# Patient Record
Sex: FEMALE
[De-identification: names, ages, dates, MRNs, and addresses within clinical notes are randomized per-mention and may not be internally consistent; named-entity substitution may affect disease eponyms.]

---

## 2023-07-28 ENCOUNTER — HOSPITAL ENCOUNTER (INPATIENT)
Dept: HOSPITAL 47 - 3MHU | Age: 48
LOS: 6 days | Discharge: HOME | DRG: 750 | End: 2023-08-03
Attending: PSYCHIATRY & NEUROLOGY | Admitting: PSYCHIATRY & NEUROLOGY
Payer: MEDICAID

## 2023-07-28 DIAGNOSIS — Z79.899: ICD-10-CM

## 2023-07-28 DIAGNOSIS — Z28.21: ICD-10-CM

## 2023-07-28 DIAGNOSIS — F12.10: ICD-10-CM

## 2023-07-28 DIAGNOSIS — F19.10: ICD-10-CM

## 2023-07-28 DIAGNOSIS — G40.909: ICD-10-CM

## 2023-07-28 DIAGNOSIS — F43.10: ICD-10-CM

## 2023-07-28 DIAGNOSIS — R09.82: ICD-10-CM

## 2023-07-28 DIAGNOSIS — F25.0: Primary | ICD-10-CM

## 2023-07-28 DIAGNOSIS — F17.210: ICD-10-CM

## 2023-07-28 DIAGNOSIS — Z71.51: ICD-10-CM

## 2023-07-28 PROCEDURE — 81003 URINALYSIS AUTO W/O SCOPE: CPT

## 2023-07-28 PROCEDURE — 83036 HEMOGLOBIN GLYCOSYLATED A1C: CPT

## 2023-07-28 PROCEDURE — 80306 DRUG TEST PRSMV INSTRMNT: CPT

## 2023-07-28 PROCEDURE — 80061 LIPID PANEL: CPT

## 2023-07-28 PROCEDURE — 81025 URINE PREGNANCY TEST: CPT

## 2023-07-28 RX ADMIN — NICOTINE POLACRILEX PRN MG: 2 GUM, CHEWING BUCCAL at 23:32

## 2023-07-28 RX ADMIN — TRIHEXYPHENIDYL HYDROCHLORIDE SCH MG: 2 TABLET ORAL at 22:48

## 2023-07-29 LAB
BACTERIA UR QL AUTO: (no result) /HPF
CHOLEST SERPL-MCNC: 204 MG/DL (ref 0–200)
HDLC SERPL-MCNC: 77 MG/DL (ref 40–60)
LDLC SERPL CALC-MCNC: 110 MG/DL (ref 0–131)
MUCOUS THREADS UR QL AUTO: (no result) /HPF
PH UR: 7 [PH] (ref 5–8)
RBC UR QL: 0 /HPF (ref 0–5)
SP GR UR: 1.01 (ref 1–1.03)
SQUAMOUS UR QL AUTO: 4 /HPF (ref 0–4)
TRIGL SERPL-MCNC: 85 MG/DL (ref 0–149)
UROBILINOGEN UR QL STRIP: 2 MG/DL (ref ?–2)
VLDLC SERPL CALC-MCNC: 17 MG/DL (ref 5–40)
WBC #/AREA URNS HPF: 5 /HPF (ref 0–5)

## 2023-07-29 RX ADMIN — TRIHEXYPHENIDYL HYDROCHLORIDE SCH MG: 2 TABLET ORAL at 21:16

## 2023-07-29 RX ADMIN — TRIHEXYPHENIDYL HYDROCHLORIDE SCH MG: 2 TABLET ORAL at 08:11

## 2023-07-29 RX ADMIN — TOPIRAMATE SCH MG: 100 TABLET, FILM COATED ORAL at 08:12

## 2023-07-29 RX ADMIN — TRIHEXYPHENIDYL HYDROCHLORIDE SCH MG: 2 TABLET ORAL at 16:24

## 2023-07-29 RX ADMIN — NICOTINE POLACRILEX PRN MG: 2 GUM, CHEWING BUCCAL at 13:48

## 2023-07-29 NOTE — P.PN
Progress Note - Text


Progress Note Date: 07/29/23





psychotic and could not be evaluated at this time

## 2023-07-29 NOTE — P.HP
Psychiatric H&P





- .


H&P Date: 07/29/23


History & Physical: 


                                    Allergies











Allergy/AdvReac Type Severity Reaction Status Date / Time


 


No Known Allergies Allergy   Verified 07/28/23 21:09








                                   Vital Signs











Temp  98.4 F   07/28/23 22:38


 


Pulse  96   07/28/23 22:38


 


Resp  18   07/28/23 22:38


 


BP  119/74   07/28/23 22:38


 


Pulse Ox  98   07/28/23 22:38


 


FiO2      








                                 Intake & Output











 07/28/23 07/29/23 07/29/23





 18:59 06:59 18:59


 


Weight  68.1 kg 








                             Laboratory Last Values











Urine Color  Yellow   07/28/23  23:55    


 


Urine Appearance  Clear  (Clear)   07/28/23  23:55    


 


Urine pH  7.0  (5.0-8.0)   07/28/23  23:55    


 


Ur Specific Gravity  1.010  (1.001-1.035)   07/28/23  23:55    


 


Urine Protein  Negative  (Negative)   07/28/23  23:55    


 


Urine Glucose (UA)  Negative  (Negative)   07/28/23  23:55    


 


Urine Ketones  Trace  (Negative)   07/28/23  23:55    


 


Urine Blood  Negative  (Negative)   07/28/23  23:55    


 


Urine Nitrite  Negative  (Negative)   07/28/23  23:55    


 


Urine Bilirubin  Negative  (Negative)   07/28/23  23:55    


 


Urine Urobilinogen  2.0 mg/dL (<2.0)   07/28/23  23:55    


 


Ur Leukocyte Esterase  Negative  (Negative)   07/28/23  23:55    


 


Urine RBC  0 /hpf (0-5)   07/28/23  23:55    


 


Urine WBC  5 /hpf (0-5)   07/28/23  23:55    


 


Ur Squamous Epith Cells  4 /hpf (0-4)   07/28/23  23:55    


 


Urine Bacteria  NONE /hpf (None)   07/28/23  23:55    


 


Urine Mucus  NONE /hpf (None)   07/28/23  23:55    


 


Urine HCG, Qual  Not Detected  (Not Detectd)   07/28/23  23:55    


 


Urine Opiates Screen  Not Detected  (NotDetected)   07/28/23  23:55    


 


Ur Oxycodone Screen  Not Detected  (NotDetected)   07/28/23  23:55    


 


Urine Methadone Screen  Not Detected  (NotDetected)   07/28/23  23:55    


 


Ur Propoxyphene Screen  Not Detected  (NotDetected)   07/28/23  23:55    


 


Ur Barbiturates Screen  Not Detected  (NotDetected)   07/28/23  23:55    


 


U Tricyclic Antidepress  Not Detected  (NotDetected)   07/28/23  23:55    


 


Ur Phencyclidine Scrn  Not Detected  (NotDetected)   07/28/23  23:55    


 


Ur Amphetamines Screen  Not Detected  (NotDetected)   07/28/23  23:55    


 


U Methamphetamines Scrn  Not Detected  (NotDetected)   07/28/23  23:55    


 


U Benzodiazepines Scrn  Detected  (NotDetected)  H  07/28/23  23:55    


 


Urine Cocaine Screen  Not Detected  (NotDetected)   07/28/23  23:55    


 


U Marijuana (THC) Screen  Detected  (NotDetected)  H  07/28/23  23:55    











07/29/23 11:46


This is a psychiatric assessment on Namita Magallon who is a 47-year-old -

American female who is currently hospitalized due to acute psychosis


Patient has been a transfer from [place] where she was initially seen in the ER


Patient is a very poor historian and continues to repeat herself stating that 

her children and her ex Pandya stating her identity to steal her money


To says that they have been doing so for the last 10 years


When asked as to what she's been doing prevented patient states that she can't 

do anything says that stating ID


Patient response to the same comment'they were  all stealing my identity'for any

questions asked


Further information could not be collected due to level reasons





Past history personal and social history:


Patient is unable to respond to any questions without going back to stating that

they were stealing her identity


However when I was screaming patient asked if she could continue to have her 

Prozac and her Topamax





Mental status examination:


General Appearance: Patient appears to be stated age is alert, directable, and 

attempts to cooperate.


 Patient appears to have slightly disheveled hygiene and grooming.  


Behavior: Patient was laying down without any agitated behavior.


  Normal psychomotor activity. 


 Eye contact is appropriate.


Speech: Patient's speech is fluent and nonpressured.  


Mood/Affect: Patient reports her mood is depressed, affect is congruent to t

hought processes


Suicidality/Homicidality: Patient denies suicidal ideation.  She reports no 

intention or plan at this time. 


 She denies any homicidal ideation, intention, and/or plan.


Perceptions: Patient denies any visual hallucinations and denies any auditory 

hallucinations


Though content/process: Remains projective and paranoid


Thinking is delusional


Memory and concentration: AOX3, grossly intact for the purposes of this session.

Can spell "WORLD" backwards


Judgment and insight: poor








Psychotic disorder unspecified


Rule out schizoaffective disorder





STRENGTHS/WEAKNESSES: Strength is that the patient is resilient.  





INTELLECT: average








PLAN: 


-Patient is admitted under voluntary status to MHU for stabilization of 

psychiatric symptoms and safety.


 Patient signed adult voluntary form and medication consent and is placed in 

patient's chart. 


-Medications : Will continue on Prolixin in 5 mg 3 times a day


Topamax 200 mg daily as previously prescribed


We'll hold the Prozac to minimize any antidepressant induced psychosis


We'll also hold the Abilify due to its avid D2 binding which would make rest of 

the antipsychotics unable to exert their therapeutic effect and would be be a 

poor  drug of choice for combination antipsychotics


Haldol and Vistaril PRN for agitation/aggression


-Patient was informed of the risks, benefits and side effects of the medication 

and patient verbally consented to taking the medications. Patient signed med 

consent form and was placed in chart.


-Internal Medicine consult to perform medical evaluation and physical.


-SW on board for discharge planning. Encourage patient to participate in groups 

to work on coping skills. 





Bryan Anand M.D.


7/29/2023

## 2023-07-30 RX ADMIN — TRIHEXYPHENIDYL HYDROCHLORIDE SCH MG: 2 TABLET ORAL at 17:08

## 2023-07-30 RX ADMIN — TRIHEXYPHENIDYL HYDROCHLORIDE SCH: 2 TABLET ORAL at 22:54

## 2023-07-30 RX ADMIN — TRIHEXYPHENIDYL HYDROCHLORIDE SCH MG: 2 TABLET ORAL at 09:29

## 2023-07-30 RX ADMIN — NICOTINE POLACRILEX PRN MG: 2 GUM, CHEWING BUCCAL at 22:53

## 2023-07-30 RX ADMIN — NICOTINE POLACRILEX PRN MG: 2 GUM, CHEWING BUCCAL at 18:24

## 2023-07-30 RX ADMIN — TOPIRAMATE SCH MG: 100 TABLET, FILM COATED ORAL at 09:29

## 2023-07-30 NOTE — P.CONS
History of Present Illness





- Reason for Consult


Consult date: 07/30/23





- History of Present Illness





The patient is a 47-year-old female with no known PMH who was admitted to the 

mental health unit due to paranoid behavior.  The patient was seen and evaluated

with the mental health unit RN present.  Patient reports that her family members

called the police and they were concerned that she wasn't acting right.  She 

denied any physical complaints.  She denied any past medical history.  Denied 

tobacco, alcohol, or substance use.  He denied experience chest discomfort or 

shortness of breath, fever, chills, cough, nausea, and, abdominal pain, 

diarrhea.





Laboratory outpatient was reviewed with urine toxicology positive for marijuana 

and benzodiazepines





Review of systems:


Pertinent positives and negatives as discussed in HPI, a complete review of 

systems was performed and all other systems are negative.





Physical examination:


General: non toxic, no distress, appears at stated age, normal weight


Derm: no unusual rashes/lesions, no unusual ecchymoses, warm, dry


Head: atraumatic, normocephalic, symmetric


Eyes: EOMI, no lid lag, anicteric sclera


ENT: Nose and ears atraumatic, no thrush,  no pharyngeal erythema


Neck: trachea midline, supple


Mouth: no lip lesion, mucus membranes moist


Cardiovascular: S1S2 reg, no murmur, no edema


Lungs: CTA bilateral, no rhonchi, no rales , no accessory muscle use


Abdominal: soft,  nontender to palpation, no guarding


Ext: no gross muscle atrophy, no contractures, 


Neuro: No gross focal neuro deficits noted 


Psych: Alert, oriented, appropriate affect 





Assessment:


Marijuana abuse


Psychosis





Imaging:


None performed





Data Review:


Laboratory outpatient was reviewed with urine toxicology positive for marijuana 

and benzodiazepines





Plan:


Advised on the importance of cessation


Defer management of psychosis to primary psychiatry service





Thank you for allowing us to participate in the care of this patient.  We will 

follow peripherally.  Do not hesitate to contact us with questions.  Someone can

be reached from the [facility] hospitalist group at all hours of the day 

at [phone].





Past Medical History


History of Any Multi-Drug Resistant Organisms: None Reported


Additional Past Surgical History / Comment(s): Reports no medical history 

however poor historian


Past Psychological History: PTSD


Additional Psychological History / Comment(s): Unable to obtain a full history 

patient states does have PTSD and sees a psychiatrist, name unkown


Smoking Status: Never smoker


Past Alcohol Use History: None Reported


Past Drug Use History: None Reported





- Past Family History


  ** Father


Family Medical History: Unable to Obtain (patient refused)





Medications and Allergies


                                Home Medications











 Medication  Instructions  Recorded  Confirmed  Type


 


Topiramate [Topamax] 200 mg PO DAILY 07/28/23 07/28/23 History


 


Trihexyphenidyl [Artane] 2 mg PO TID 07/28/23 07/28/23 History


 


fluPHENAZine [Prolixin 5MG] 5 mg PO TID 07/28/23 07/28/23 History








                                    Allergies











Allergy/AdvReac Type Severity Reaction Status Date / Time


 


No Known Allergies Allergy   Verified 07/28/23 21:09














Results


Labs: 


                  Abnormal Lab Results - Last 24 Hours (Table)











  07/29/23 Range/Units





  08:32 


 


Cholesterol  204.00 H  (0..00)  mg/dL


 


HDL Cholesterol  77.00 H  (40.00-60.00)  mg/dL

## 2023-07-30 NOTE — P.PN
Subjective


Progress Note Date: 07/30/23


Principal diagnosis: 





Psychotic disorder unspecified


Rule out schizoaffective disorder





Patient Name: Trini Crouch   Medical Record Number: [id number]


Date of Birth: [birth year]   Patient Status: Inpatient








Subjective data:


Patient was laying in her bed and did not seem to be in any acute distress


Patient was easily aroused and stated that she wants her Prozac to be restarted


When discussed about her psychosis patient states that I do not know her and 

that I only talk to her 1 time and that there is no way she is going to believe 

any information that I give her


She said that she wants her Prozac restarted and that's that


She denies that she has any other issues or problems


She says that her ID is being stolen by everyone and that's where the problem is





Mental status examination:


General Appearance: Patient appears to be stated age is alert, directable, and 

attempts to cooperate.


 Patient appears to have slightly disheveled hygiene and grooming.  


Behavior: Patient was laying down without any agitated behavior.


  Normal psychomotor activity. 


 Eye contact is appropriate.


Speech: Patient's speech is fluent and nonpressured.  


Mood/Affect: Patient reports her mood is depressed, affect is congruent to 

thought processes


Suicidality/Homicidality: Patient denies suicidal ideation.  She reports no 

intention or plan at this time. 


 She denies any homicidal ideation, intention, and/or plan.


Perceptions: Patient denies any visual hallucinations and denies any auditory h

allucinations


Though content/process: Remains projective and paranoid


Thinking is delusional


Memory and concentration: AOX3, grossly intact for the purposes of this session.

Can spell "WORLD" backwards


Judgment and insight: poor








Psychotic disorder unspecified


Rule out schizoaffective disorder





STRENGTHS/WEAKNESSES: Strength is that the patient is resilient.  





INTELLECT: average








PLAN: 


-Patient is admitted under voluntary status to MHU for stabilization of 

psychiatric symptoms and safety.


 Patient signed adult voluntary form and medication consent and is placed in 

patient's chart. 


-Medications : Will continue on Prolixin in 5 mg 3 times a day


Topamax 200 mg daily as previously prescribed


We will restart the Prozac but at 20 mg a day and monitor closely


We'll also hold the Abilify due to its avid D2 binding which would make rest of 

the antipsychotics unable to exert their therapeutic effect and would be be a 

poor  drug of choice for combination antipsychotics


Haldol and Vistaril PRN for agitation/aggression


-Patient was informed of the risks, benefits and side effects of the medication 

and patient verbally consented to taking the medications. Patient signed med 

consent form and was placed in chart.


-Internal Medicine consult to perform medical evaluation and physical.


-SW on board for discharge planning. Encourage patient to participate in groups 

to work on coping skills. 





Bryan Anand M.D.


7/30/2023





Objective





- Vital Signs


Vital signs: 


                                   Vital Signs











Temp  97.9 F   07/30/23 07:00


 


Pulse  68   07/30/23 07:00


 


Resp  18   07/30/23 07:00


 


BP  92/52   07/30/23 07:00


 


Pulse Ox  98   07/30/23 07:00


 


FiO2      














- Labs


Labs: 


                  Abnormal Lab Results - Last 24 Hours (Table)











  07/29/23 Range/Units





  08:32 


 


Cholesterol  204.00 H  (0..00)  mg/dL


 


HDL Cholesterol  77.00 H  (40.00-60.00)  mg/dL

## 2023-07-31 RX ADMIN — TRIHEXYPHENIDYL HYDROCHLORIDE SCH MG: 2 TABLET ORAL at 18:38

## 2023-07-31 RX ADMIN — TOPIRAMATE SCH MG: 100 TABLET, FILM COATED ORAL at 08:31

## 2023-07-31 RX ADMIN — NICOTINE POLACRILEX PRN MG: 2 GUM, CHEWING BUCCAL at 11:14

## 2023-07-31 RX ADMIN — NICOTINE POLACRILEX PRN MG: 2 GUM, CHEWING BUCCAL at 18:38

## 2023-07-31 RX ADMIN — TRIHEXYPHENIDYL HYDROCHLORIDE SCH: 2 TABLET ORAL at 21:43

## 2023-07-31 RX ADMIN — TRIHEXYPHENIDYL HYDROCHLORIDE SCH MG: 2 TABLET ORAL at 08:31

## 2023-07-31 NOTE — P.PN
Progress Note - Text


Progress Note Date: 07/31/23





Interval History:


Patient was seen resting in bed and was directable and agreeable to speak with 

writer in her room.,  The patient is alert and oriented to person, place, and 

time.  She is not reporting any suicidal or homicidal ideation, intention, 

and/or plan.  She is not reporting any auditory or visual hallucinations.  

However, the patient maintains that her  and daughter have been trying to

steal her identity.  The patient becomes irritable and guarded when discussing 

the reasons for her admission.  This provider attempts to bring up the petition 

which described "Dangelo Carter, paranoid and calling police at home thinking she 

is being stabbed her hearing gunshots."  The patient however does not wish to 

discuss this.  The patient reports that she follows with [facility] in [place] and the 

like to continue with her outpatient treatment.  She is otherwise not reporting 

any acute issues or concerns to this provider.  She remains primarily as noted 

herself in her room.  This provider discussed long-acting injectable medication 

however she appears to be disinterested at this time.





Mental Status Exam:


General Appearance: Patient appears to be stated age is alert, directable, and 

cooperative. 


Behavior: Patient is calmly lying down in bed without any agitated behavior.  

Intense eye contact.


Speech: Patient's speech is fluent and nonpressured. 


Mood/Affect: Mood is "I'm doing fine," affect is irritable and suspicious.


Suicidality/Homicidality:  Patient reports no suicidal or homicidal ideation.


Perceptions: Patient denies any visual hallucinations and denies any auditory 

hallucinations  


Though content/process: Patient endorses significant paranoia.


Memory and concentration: AOX3, grossly intact for the purposes of this session


Judgment and insight: Very poor





                                   Vital Signs











Temp  97.9 F   07/30/23 07:00


 


Pulse  68   07/30/23 07:00


 


Resp  18   07/30/23 07:00


 


BP  92/52   07/30/23 07:00


 


Pulse Ox  98   07/30/23 07:00


 


FiO2      














Assessment


Psychosis, unspecified


Rule out schizoaffective disorder


Nicotine dependence





Plan:


-Patient continues to meet criteria for inpatient psychiatric admission for 

symptom stabilization and safety. Patient has signed adult voluntary form and 

medication consent and was placed in patient's chart.


-Medications: 


Continue Prolixin 5 mg by mouth 3 times a day for psychosis


Continue Prozac 20 mg by mouth daily for depression/anxiety


Continue Topamax 200 mg by mouth daily


Continue Artane 2 mg by mouth 3 times a day


Consider augmentation with Depakote or Lamictal.


-When necessary Ativan and Haldol for agitation/aggression.


-NRT - nicotine gum


-SW on board for discharge planning.  Encouraged the patient to participate in 

milieu.

## 2023-08-01 RX ADMIN — NICOTINE POLACRILEX PRN MG: 2 GUM, CHEWING BUCCAL at 13:34

## 2023-08-01 RX ADMIN — TRIHEXYPHENIDYL HYDROCHLORIDE SCH MG: 2 TABLET ORAL at 09:00

## 2023-08-01 RX ADMIN — NICOTINE POLACRILEX PRN MG: 2 GUM, CHEWING BUCCAL at 09:00

## 2023-08-01 RX ADMIN — TOPIRAMATE SCH MG: 100 TABLET, FILM COATED ORAL at 08:59

## 2023-08-01 NOTE — P.PN
Progress Note - Text


Progress Note Date: 08/01/23








Interval History:


Patient was seen wandering the hallways and was agreeable to speak at the writer

in the office.  Currently, the patient is not reporting any suicidal or 

homicidal ideation, intention, and/or plan.  She is not reporting any auditory 

or visual hallucinations.  She only endorses concerns that her daughter has been

attempting to steal her identity.  She is not expressing any overt delusional 

thought content or any other delusional themes.  She has been adherent with her 

medication and is not reporting any significant side effects.  This provider 

also discussed with her outpatient psychiatric provider regarding her current 

treatment regimen.  The patient has a history of nonadherence with treatment and

has missed 5 of her last outpatient psychiatric appointments.  She does have 

significant history of schizoaffective disorder and traumatic brain injury.  The

outpatient treatment team is recommending long-acting injectable medication.  

The patient is not agreeable at this time.  She otherwise reports no issues 

regarding her sleep or appetite.  She denies any medical issues or concerns.





Mental Status Exam:


General Appearance: Patient appears to be stated age is alert, directable, and 

cooperative. 


Behavior: Patient is calmly lying down in bed without any agitated behavior.  

Fair eye contact.


Speech: Patient's speech is fluent and nonpressured. 


Mood/Affect: Mood is "I'm feeling better," affect is friendly and polite.


Suicidality/Homicidality:  Patient reports no suicidal or homicidal ideation.


Perceptions: Patient denies any visual hallucinations and denies any auditory 

hallucinations  


Though content/process: Patient reports paranoia towards her daughter.  No other

delusional thought content endorse.  Linear and logical in short conversation.


Memory and concentration: AOX3, grossly intact for the purposes of this session


Judgment and insight: Mildly improving





                                   Vital Signs











Temp  97.8 F   08/01/23 06:57


 


Pulse  62   08/01/23 06:57


 


Resp  17   08/01/23 06:57


 


BP  81/51   08/01/23 06:57


 


Pulse Ox  99   08/01/23 06:57


 


FiO2      














Assessment


Psychosis, unspecified


Rule out schizoaffective disorder


Nicotine dependence





Plan:


-Patient continues to meet criteria for inpatient psychiatric admission for 

symptom stabilization and safety. Patient has signed adult voluntary form and 

medication consent and was placed in patient's chart.


-Medications: 


Decrease Prolixin to 5 mg by mouth twice a day - concern for hypotension


Continue Prozac 20 mg by mouth daily for depression/anxiety


Continue Topamax 200 mg by mouth daily


Discontinue Artane


Consider augmentation with Depakote or Lamictal.


-When necessary Ativan and Haldol for agitation/aggression.


-NRT - nicotine gum


-SW on board for discharge planning.  Encouraged the patient to participate in 

milieu.

## 2023-08-02 VITALS — TEMPERATURE: 97.9 F

## 2023-08-02 RX ADMIN — NICOTINE POLACRILEX PRN MG: 2 GUM, CHEWING BUCCAL at 12:05

## 2023-08-02 RX ADMIN — TOPIRAMATE SCH MG: 100 TABLET, FILM COATED ORAL at 09:14

## 2023-08-02 RX ADMIN — NICOTINE POLACRILEX PRN MG: 2 GUM, CHEWING BUCCAL at 16:25

## 2023-08-02 NOTE — P.PN
Progress Note - Text


Progress Note Date: 08/02/23








Interval History:


Patient was seen wandering the hallways and was agreeable to speak at the writer

in the office.  Currently, the patient is not reporting any suicidal or 

homicidal ideation, intention, and/or plan.  She is not reporting any auditory 

or visual hallucinations.  She is not forthcoming with any delusional thought 

content today. She is agreeable to transitioning to Prolixin Decanoate today and

stopping the oral medication. She reports no issues regarding sleep or appetite.

She reports no medical issues or concerns aside from post-nasal drip which she 

manages with benadryl.   





Mental Status Exam:


General Appearance: Patient appears to be stated age is alert, directable, and 

cooperative. 


Behavior: Patient is calmly lying down in bed without any agitated behavior.  

Fair eye contact.


Speech: Patient's speech is fluent and nonpressured. 


Mood/Affect: Mood is "I'm feeling well," affect is friendly and polite.


Suicidality/Homicidality:  Patient reports no suicidal or homicidal ideation.


Perceptions: Patient denies any visual hallucinations and denies any auditory 

hallucinations  


Though content/process: Patient does not report any overt paranoid or delusional

thought content. Linear and logical in short conversation.


Memory and concentration: AOX3, grossly intact for the purposes of this session


Judgment and insight: Mildly improving





                                   Vital Signs











Temp  97.9 F   08/02/23 09:15


 


Pulse  91   08/02/23 09:15


 


Resp  16   08/02/23 09:15


 


BP  99/64   08/02/23 09:15


 


Pulse Ox  99   08/01/23 06:57


 


FiO2      

















Assessment


Schizoaffective disorder, bipolar type as per history


Nicotine dependence





Plan:


-Patient continues to meet criteria for inpatient psychiatric admission for 

symptom stabilization and safety. Patient has signed adult voluntary form and 

medication consent and was placed in patient's chart.


-Medications: 


Discontinue Prolixin oral medication. Administer Prolixin decanoate 25 mg IM


Continue Prozac 20 mg by mouth daily for depression/anxiety


Continue Topamax 200 mg by mouth daily


-When necessary Ativan and Haldol for agitation/aggression.


-NRT - nicotine gum


-SW on board for discharge planning.  Encouraged the patient to participate in 

milieu.

## 2023-08-03 VITALS — HEART RATE: 79 BPM | RESPIRATION RATE: 20 BRPM | SYSTOLIC BLOOD PRESSURE: 108 MMHG | DIASTOLIC BLOOD PRESSURE: 69 MMHG

## 2023-08-03 RX ADMIN — NICOTINE POLACRILEX PRN MG: 2 GUM, CHEWING BUCCAL at 09:09

## 2023-08-03 RX ADMIN — TOPIRAMATE SCH MG: 100 TABLET, FILM COATED ORAL at 09:09
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
